# Patient Record
Sex: FEMALE
[De-identification: names, ages, dates, MRNs, and addresses within clinical notes are randomized per-mention and may not be internally consistent; named-entity substitution may affect disease eponyms.]

---

## 2020-10-15 PROBLEM — Z00.00 ENCOUNTER FOR PREVENTIVE HEALTH EXAMINATION: Status: ACTIVE | Noted: 2020-10-15

## 2020-10-21 ENCOUNTER — APPOINTMENT (OUTPATIENT)
Dept: OTOLARYNGOLOGY | Facility: CLINIC | Age: 45
End: 2020-10-21
Payer: MEDICAID

## 2020-10-21 VITALS — HEIGHT: 64 IN | TEMPERATURE: 97.3 F | BODY MASS INDEX: 30.39 KG/M2 | WEIGHT: 178 LBS

## 2020-10-21 DIAGNOSIS — Z83.3 FAMILY HISTORY OF DIABETES MELLITUS: ICD-10-CM

## 2020-10-21 DIAGNOSIS — H61.23 IMPACTED CERUMEN, BILATERAL: ICD-10-CM

## 2020-10-21 DIAGNOSIS — Z86.39 PERSONAL HISTORY OF OTHER ENDOCRINE, NUTRITIONAL AND METABOLIC DISEASE: ICD-10-CM

## 2020-10-21 DIAGNOSIS — H92.03 OTALGIA, BILATERAL: ICD-10-CM

## 2020-10-21 PROCEDURE — 99204 OFFICE O/P NEW MOD 45 MIN: CPT | Mod: 25

## 2020-10-21 PROCEDURE — 69210 REMOVE IMPACTED EAR WAX UNI: CPT

## 2020-10-21 PROCEDURE — 99072 ADDL SUPL MATRL&STAF TM PHE: CPT

## 2020-10-21 RX ORDER — LEVOTHYROXINE SODIUM 137 UG/1
TABLET ORAL
Refills: 0 | Status: ACTIVE | COMMUNITY

## 2020-10-21 RX ORDER — CLONAZEPAM 2 MG/1
TABLET ORAL
Refills: 0 | Status: ACTIVE | COMMUNITY

## 2020-10-21 NOTE — HISTORY OF PRESENT ILLNESS
[de-identified] : ERIC OTTO has a history of tinnitus described as a flutter sensation and pain in the right ear greater than the left. Also reports a generalized tremor sensation.  Previously. evaluated by neurologist.  Sensitivity to noise reported.  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 \par The patient reports recent treatment by a psychiatrist for anxiety. She has recently started on antidepressant medication.

## 2020-10-21 NOTE — ASSESSMENT
[FreeTextEntry1] : Symptoms appear to be related to TMJ issues with referred otalgia. There also appears to be generalized anxiety issues. I have discussed this with her. I have reviewed conservative measures for TMJ dysfunction and have suggested a consultation with an oral surgeon.\par \par I have requested an opportunity to review her recent audiometry.

## 2020-10-21 NOTE — CONSULT LETTER
[Please see my note below.] : Please see my note below. [FreeTextEntry2] : Dear FANTASMA SEGURA  [FreeTextEntry1] : Thank you for allowing me to participate in the care of ERIC OTTO .\par Please see the attached visit note.\par \par \par \par Jakob Duran\par Otology\par Medical Director of [facility]\par Department of Otolaryngology\par [facility]

## 2020-10-21 NOTE — DATA REVIEWED
[de-identified] : The patient reports recent audiometry which was not available to me for review. She states that the hearing test was normal

## 2020-10-26 ENCOUNTER — APPOINTMENT (OUTPATIENT)
Dept: OTOLARYNGOLOGY | Facility: CLINIC | Age: 45
End: 2020-10-26